# Patient Record
Sex: MALE | URBAN - METROPOLITAN AREA
[De-identification: names, ages, dates, MRNs, and addresses within clinical notes are randomized per-mention and may not be internally consistent; named-entity substitution may affect disease eponyms.]

---

## 2023-04-11 ENCOUNTER — APPOINTMENT (OUTPATIENT)
Dept: URBAN - METROPOLITAN AREA CLINIC 217 | Age: 28
Setting detail: DERMATOLOGY
End: 2023-04-15

## 2023-04-11 DIAGNOSIS — L81.4 OTHER MELANIN HYPERPIGMENTATION: ICD-10-CM

## 2023-04-11 DIAGNOSIS — Z00.00 ENCOUNTER FOR GENERAL ADULT MEDICAL EXAMINATION WITHOUT ABNORMAL FINDINGS: ICD-10-CM

## 2023-04-11 DIAGNOSIS — Q82.5 CONGENITAL NON-NEOPLASTIC NEVUS: ICD-10-CM

## 2023-04-11 DIAGNOSIS — L73.8 OTHER SPECIFIED FOLLICULAR DISORDERS: ICD-10-CM

## 2023-04-11 PROCEDURE — OTHER TREATMENT REGIMEN: OTHER

## 2023-04-11 PROCEDURE — 99203 OFFICE O/P NEW LOW 30 MIN: CPT

## 2023-04-11 PROCEDURE — OTHER DIAGNOSIS COMMENT: OTHER

## 2023-04-11 PROCEDURE — OTHER COUNSELING: OTHER

## 2023-04-11 PROCEDURE — OTHER MIPS QUALITY: OTHER

## 2023-04-11 PROCEDURE — OTHER OBSERVATION: OTHER

## 2023-04-11 ASSESSMENT — LOCATION DETAILED DESCRIPTION DERM
LOCATION DETAILED: LEFT ANTERIOR SCROTUM
LOCATION DETAILED: RIGHT LATERAL INFERIOR CHEST
LOCATION DETAILED: RIGHT CLAVICULAR SKIN
LOCATION DETAILED: LEFT PROXIMAL POSTERIOR UPPER ARM
LOCATION DETAILED: RIGHT POSTERIOR SHOULDER

## 2023-04-11 ASSESSMENT — LOCATION SIMPLE DESCRIPTION DERM
LOCATION SIMPLE: CHEST
LOCATION SIMPLE: SCROTUM
LOCATION SIMPLE: RIGHT CLAVICULAR SKIN
LOCATION SIMPLE: RIGHT SHOULDER
LOCATION SIMPLE: LEFT UPPER ARM

## 2023-04-11 ASSESSMENT — LOCATION ZONE DERM
LOCATION ZONE: GENITALIA
LOCATION ZONE: ARM
LOCATION ZONE: TRUNK

## 2023-04-11 NOTE — PROCEDURE: DIAGNOSIS COMMENT
Comment: Suspect Dx based on Hx; did not physically examine as patient declined evaluation today
Detail Level: Simple
Render Risk Assessment In Note?: no

## 2023-04-11 NOTE — HPI: PREVENTATIVE SKIN CHECK
What Is The Reason For Today's Visit?: Mole Check
Additional History: Patient presents today with his wife and twelve day old baby.  Patient reports he has moles that are changing in size, shape and color.  He also notes black spots he gets on his fingers that he picks out.  He does not have any today to show.  He does report a past history of warts. Lastly, patient notes white bumps he has on his testicles. He states he has had them since he was [age] years old and they are now getting larger (largest one is the size of an M&M). \\n\\nPatient was screened before evaluation for COVID-19 by inquiring about fever, shortness of breath, weakness, fatigue, loss of taste or smell and gastrointestinal symptoms. Patient denies any of the above.\\n\\nNote: the new paper History and Intake form was reviewed at the time of visit, but the data was not entered yet into [facility].\\n

## 2023-04-11 NOTE — PROCEDURE: TREATMENT REGIMEN
Plan: Patient will monitor; if evolution noted in future, he will f/u and have evaluated
Detail Level: Zone

## 2023-04-11 NOTE — PROCEDURE: COUNSELING
[FreeTextEntry1] : Hx of HTN, hyperlipidemia, Heart disease, DM II\par cont current meds\par blood work ordered\par \par follow up in one week for lab results\par 
Detail Level: Zone
Detail Level: Detailed